# Patient Record
Sex: FEMALE | NOT HISPANIC OR LATINO
[De-identification: names, ages, dates, MRNs, and addresses within clinical notes are randomized per-mention and may not be internally consistent; named-entity substitution may affect disease eponyms.]

---

## 2017-01-18 PROBLEM — Z00.00 ENCOUNTER FOR PREVENTIVE HEALTH EXAMINATION: Status: ACTIVE | Noted: 2017-01-18

## 2017-01-24 ENCOUNTER — APPOINTMENT (OUTPATIENT)
Dept: CARDIOLOGY | Facility: CLINIC | Age: 67
End: 2017-01-24

## 2017-01-24 VITALS
WEIGHT: 187 LBS | SYSTOLIC BLOOD PRESSURE: 142 MMHG | HEIGHT: 63 IN | BODY MASS INDEX: 33.13 KG/M2 | HEART RATE: 69 BPM | DIASTOLIC BLOOD PRESSURE: 80 MMHG

## 2017-01-24 DIAGNOSIS — R06.09 OTHER FORMS OF DYSPNEA: ICD-10-CM

## 2017-01-27 RX ORDER — SIMVASTATIN 20 MG/1
20 TABLET, FILM COATED ORAL
Qty: 90 | Refills: 3 | Status: ACTIVE | COMMUNITY

## 2017-01-27 RX ORDER — IBANDRONATE SODIUM 150 MG/1
150 TABLET ORAL
Refills: 0 | Status: ACTIVE | COMMUNITY

## 2017-01-27 RX ORDER — LEVOTHYROXINE SODIUM 0.05 MG/1
50 TABLET ORAL DAILY
Refills: 0 | Status: ACTIVE | COMMUNITY

## 2017-02-01 ENCOUNTER — APPOINTMENT (OUTPATIENT)
Dept: CARDIOLOGY | Facility: CLINIC | Age: 67
End: 2017-02-01

## 2017-02-03 ENCOUNTER — APPOINTMENT (OUTPATIENT)
Dept: CARDIOLOGY | Facility: CLINIC | Age: 67
End: 2017-02-03

## 2017-02-07 ENCOUNTER — APPOINTMENT (OUTPATIENT)
Dept: CARDIOLOGY | Facility: CLINIC | Age: 67
End: 2017-02-07

## 2017-02-07 VITALS
SYSTOLIC BLOOD PRESSURE: 130 MMHG | HEIGHT: 63 IN | DIASTOLIC BLOOD PRESSURE: 78 MMHG | WEIGHT: 190 LBS | BODY MASS INDEX: 33.66 KG/M2

## 2017-02-07 DIAGNOSIS — I10 ESSENTIAL (PRIMARY) HYPERTENSION: ICD-10-CM

## 2017-02-09 PROBLEM — I10 ELEVATED BLOOD PRESSURE: Status: ACTIVE | Noted: 2017-02-09

## 2018-03-08 ENCOUNTER — APPOINTMENT (OUTPATIENT)
Dept: VASCULAR SURGERY | Facility: CLINIC | Age: 68
End: 2018-03-08
Payer: MEDICARE

## 2018-03-08 ENCOUNTER — APPOINTMENT (OUTPATIENT)
Dept: VASCULAR SURGERY | Facility: CLINIC | Age: 68
End: 2018-03-08

## 2018-03-08 VITALS
SYSTOLIC BLOOD PRESSURE: 122 MMHG | HEIGHT: 63 IN | DIASTOLIC BLOOD PRESSURE: 84 MMHG | WEIGHT: 190 LBS | BODY MASS INDEX: 33.66 KG/M2

## 2018-03-08 DIAGNOSIS — Z87.891 PERSONAL HISTORY OF NICOTINE DEPENDENCE: ICD-10-CM

## 2018-03-08 DIAGNOSIS — E03.9 HYPOTHYROIDISM, UNSPECIFIED: ICD-10-CM

## 2018-03-08 DIAGNOSIS — E78.00 PURE HYPERCHOLESTEROLEMIA, UNSPECIFIED: ICD-10-CM

## 2018-03-08 DIAGNOSIS — M79.89 OTHER SPECIFIED SOFT TISSUE DISORDERS: ICD-10-CM

## 2018-03-08 PROCEDURE — 99203 OFFICE O/P NEW LOW 30 MIN: CPT

## 2018-03-08 PROCEDURE — 93970 EXTREMITY STUDY: CPT

## 2018-03-15 ENCOUNTER — OUTPATIENT (OUTPATIENT)
Dept: OUTPATIENT SERVICES | Facility: HOSPITAL | Age: 68
LOS: 1 days | Discharge: HOME | End: 2018-03-15

## 2018-03-15 ENCOUNTER — APPOINTMENT (OUTPATIENT)
Dept: OBGYN | Facility: CLINIC | Age: 68
End: 2018-03-15
Payer: MEDICARE

## 2018-03-15 VITALS — HEIGHT: 64 IN | WEIGHT: 190 LBS | BODY MASS INDEX: 32.44 KG/M2

## 2018-03-15 DIAGNOSIS — Z01.419 ENCOUNTER FOR GYNECOLOGICAL EXAMINATION (GENERAL) (ROUTINE) W/OUT ABNORMAL FINDINGS: ICD-10-CM

## 2018-03-15 DIAGNOSIS — Z00.00 ENCOUNTER FOR GENERAL ADULT MEDICAL EXAMINATION WITHOUT ABNORMAL FINDINGS: ICD-10-CM

## 2018-03-15 PROCEDURE — G0101: CPT

## 2018-03-15 PROCEDURE — 81003 URINALYSIS AUTO W/O SCOPE: CPT | Mod: QW

## 2018-03-16 LAB
BILIRUB UR QL STRIP: NORMAL
GLUCOSE UR-MCNC: NORMAL
HCG UR QL: NORMAL EU/DL
HGB UR QL STRIP.AUTO: NORMAL
KETONES UR-MCNC: NORMAL
LEUKOCYTE ESTERASE UR QL STRIP: NORMAL
NITRITE UR QL STRIP: NORMAL
PH UR STRIP: 8
PROT UR STRIP-MCNC: NORMAL
SP GR UR STRIP: 1005

## 2018-03-21 LAB — HPV HIGH+LOW RISK DNA PNL CVX: NOT DETECTED

## 2019-03-15 ENCOUNTER — OUTPATIENT (OUTPATIENT)
Dept: OUTPATIENT SERVICES | Facility: HOSPITAL | Age: 69
LOS: 1 days | Discharge: HOME | End: 2019-03-15

## 2019-03-15 ENCOUNTER — APPOINTMENT (OUTPATIENT)
Dept: OBGYN | Facility: CLINIC | Age: 69
End: 2019-03-15
Payer: MEDICARE

## 2019-03-15 VITALS — BODY MASS INDEX: 34.02 KG/M2 | HEIGHT: 63 IN | WEIGHT: 192 LBS

## 2019-03-15 DIAGNOSIS — M85.80 OTHER SPECIFIED DISORDERS OF BONE DENSITY AND STRUCTURE, UNSPECIFIED SITE: ICD-10-CM

## 2019-03-15 DIAGNOSIS — N76.2 ACUTE VULVITIS: ICD-10-CM

## 2019-03-15 DIAGNOSIS — D21.9 BENIGN NEOPLASM OF CONNECTIVE AND OTHER SOFT TISSUE, UNSPECIFIED: ICD-10-CM

## 2019-03-15 DIAGNOSIS — N39.3 STRESS INCONTINENCE (FEMALE) (MALE): ICD-10-CM

## 2019-03-15 DIAGNOSIS — Z00.00 ENCOUNTER FOR GENERAL ADULT MEDICAL EXAMINATION WITHOUT ABNORMAL FINDINGS: ICD-10-CM

## 2019-03-15 LAB
BILIRUB UR QL STRIP: NORMAL
GLUCOSE UR-MCNC: NORMAL
HCG UR QL: NORMAL EU/DL
HGB UR QL STRIP.AUTO: NORMAL
KETONES UR-MCNC: NORMAL
LEUKOCYTE ESTERASE UR QL STRIP: NORMAL
NITRITE UR QL STRIP: NORMAL
PH UR STRIP: 7
PROT UR STRIP-MCNC: NORMAL
SP GR UR STRIP: 1

## 2019-03-15 PROCEDURE — 81003 URINALYSIS AUTO W/O SCOPE: CPT | Mod: QW

## 2019-03-15 PROCEDURE — G0101: CPT

## 2019-03-15 RX ORDER — CLOTRIMAZOLE AND BETAMETHASONE DIPROPIONATE 10; .5 MG/G; MG/G
1-0.05 CREAM TOPICAL 3 TIMES DAILY
Qty: 1 | Refills: 1 | Status: ACTIVE | COMMUNITY
Start: 2019-03-15 | End: 1900-01-01

## 2020-04-23 ENCOUNTER — APPOINTMENT (OUTPATIENT)
Dept: OBGYN | Facility: CLINIC | Age: 70
End: 2020-04-23

## 2020-12-16 PROBLEM — Z01.419 ENCOUNTER FOR GYNECOLOGICAL EXAMINATION WITHOUT ABNORMAL FINDING: Status: RESOLVED | Noted: 2018-03-15 | Resolved: 2020-12-16

## 2022-01-04 ENCOUNTER — APPOINTMENT (OUTPATIENT)
Dept: OBGYN | Facility: CLINIC | Age: 72
End: 2022-01-04
Payer: MEDICARE

## 2022-01-04 VITALS — TEMPERATURE: 97.8 F | BODY MASS INDEX: 33.19 KG/M2 | WEIGHT: 185 LBS | HEIGHT: 62.5 IN

## 2022-01-04 DIAGNOSIS — N81.6 RECTOCELE: ICD-10-CM

## 2022-01-04 DIAGNOSIS — Z01.411 ENCOUNTER FOR GYNECOLOGICAL EXAMINATION (GENERAL) (ROUTINE) WITH ABNORMAL FINDINGS: ICD-10-CM

## 2022-01-04 DIAGNOSIS — N81.4 UTEROVAGINAL PROLAPSE, UNSPECIFIED: ICD-10-CM

## 2022-01-04 DIAGNOSIS — N81.11 CYSTOCELE, MIDLINE: ICD-10-CM

## 2022-01-04 DIAGNOSIS — N83.299 OTHER OVARIAN CYST, UNSPECIFIED SIDE: ICD-10-CM

## 2022-01-04 PROCEDURE — G0101: CPT

## 2022-01-04 PROCEDURE — 81003 URINALYSIS AUTO W/O SCOPE: CPT | Mod: QW

## 2022-01-04 NOTE — REVIEW OF SYSTEMS
Patient left  msg for refill.      Last seen 7/2/2020  Next appt 10/6/2020  [facility] [Negative] : Heme/Lymph

## 2022-01-06 LAB
BILIRUB UR QL STRIP: NEGATIVE
GLUCOSE UR-MCNC: NEGATIVE
HCG UR QL: 0.2 EU/DL
HGB UR QL STRIP.AUTO: NORMAL
KETONES UR-MCNC: NEGATIVE
LEUKOCYTE ESTERASE UR QL STRIP: NORMAL
NITRITE UR QL STRIP: NEGATIVE
PH UR STRIP: 5.5
PROT UR STRIP-MCNC: NEGATIVE
SP GR UR STRIP: 1.01

## 2022-01-10 LAB — CYTOLOGY CVX/VAG DOC THIN PREP: ABNORMAL
